# Patient Record
Sex: MALE | NOT HISPANIC OR LATINO
[De-identification: names, ages, dates, MRNs, and addresses within clinical notes are randomized per-mention and may not be internally consistent; named-entity substitution may affect disease eponyms.]

---

## 2022-08-18 PROBLEM — Z00.00 ENCOUNTER FOR PREVENTIVE HEALTH EXAMINATION: Status: ACTIVE | Noted: 2022-08-18

## 2022-08-19 ENCOUNTER — NON-APPOINTMENT (OUTPATIENT)
Age: 42
End: 2022-08-19

## 2022-08-22 ENCOUNTER — APPOINTMENT (OUTPATIENT)
Dept: CARDIOLOGY | Facility: CLINIC | Age: 42
End: 2022-08-22

## 2022-08-22 ENCOUNTER — NON-APPOINTMENT (OUTPATIENT)
Age: 42
End: 2022-08-22

## 2022-08-22 ENCOUNTER — RESULT REVIEW (OUTPATIENT)
Age: 42
End: 2022-08-22

## 2022-08-22 VITALS
DIASTOLIC BLOOD PRESSURE: 80 MMHG | SYSTOLIC BLOOD PRESSURE: 137 MMHG | BODY MASS INDEX: 26.48 KG/M2 | OXYGEN SATURATION: 98 % | HEIGHT: 70 IN | HEART RATE: 54 BPM | WEIGHT: 185 LBS

## 2022-08-22 DIAGNOSIS — R07.9 CHEST PAIN, UNSPECIFIED: ICD-10-CM

## 2022-08-22 DIAGNOSIS — Z82.49 FAMILY HISTORY OF ISCHEMIC HEART DISEASE AND OTHER DISEASES OF THE CIRCULATORY SYSTEM: ICD-10-CM

## 2022-08-22 DIAGNOSIS — Z86.79 PERSONAL HISTORY OF OTHER DISEASES OF THE CIRCULATORY SYSTEM: ICD-10-CM

## 2022-08-22 DIAGNOSIS — Z78.9 OTHER SPECIFIED HEALTH STATUS: ICD-10-CM

## 2022-08-22 DIAGNOSIS — R01.1 CARDIAC MURMUR, UNSPECIFIED: ICD-10-CM

## 2022-08-22 PROCEDURE — 93000 ELECTROCARDIOGRAM COMPLETE: CPT

## 2022-08-22 PROCEDURE — 99204 OFFICE O/P NEW MOD 45 MIN: CPT | Mod: 25

## 2022-08-22 NOTE — PHYSICAL EXAM
[Normal S1, S2] : normal S1, S2 [No Rub] : no rub [No Gallop] : no gallop [Soft] : abdomen soft [Non Tender] : non-tender [Normal Radial B/L] : normal radial B/L [Normal] : alert and oriented, normal memory [de-identified] : DM 2/6

## 2022-08-22 NOTE — REVIEW OF SYSTEMS
[Feeling Fatigued] : feeling fatigued [Snoring] : snoring [Negative] : Heme/Lymph [Fever] : no fever [Headache] : no headache [Chills] : no chills [Cough] : no cough [Wheezing] : no wheezing [Coughing Up Blood] : no hemoptysis [Abdominal Pain] : no abdominal pain [Nausea] : no nausea [Vomiting] : no vomiting [Diarrhea] : diarrhea [Blood in stool] : no blood in stoo [Urinary Frequency] : no change in urinary frequency [Hematuria] : no hematuria [Pain During Urination] : no dysuria

## 2022-08-22 NOTE — HISTORY OF PRESENT ILLNESS
[FreeTextEntry1] : 42 yo AA with history of elevated BP not officially diagnosed with HTN, who presents for cardiac evaluation after ED visit.  EKG, CXR and blood work were done and pt was sent home.  Pt developed R sided chest discomfort while working.  Went to the pharmacy and BP was checked there 160/80 per pt.  Pt then told his supervisor about it and was sent to the ED at [facility].  He has been measuring at home 130-150s systolics.  The pain is still there intermittent.  Pain mostly at rest, non radiating, mild to moderate in intensity, nagging sharp pain.  Denies WALLS, SOB, palpations, COVID x 2, last covid illness last summer.  \par \par Soc Hx: non smoker, + ETOH use 2-3 times per week abut two drinks, no illicit drug use, work as  \par Fam Hx: grandmother  AAA 70s; aunt  AAA 60s, cousin diagnosed with AAA s/p surgery

## 2022-08-22 NOTE — DISCUSSION/SUMMARY
[FreeTextEntry1] : 40 yo AA with history of elevated BP not officially diagnosed with HTN, who presents for cardiac evaluation after ED visit.  Given abn EKG, elevated BP and murmur will obtain TTE to rule out structural heart disease.  Given chest pain which is mostly atypical will obtain exercise stress testing.  Elevated BP without official diagnosis of HTN which he likely has.  He wishes to refrain from medication use at this time and attempt in lifestyle modification and ambulatory monitoring for now.  Long discussion about risks of uncontrolled HTN.  Educated on lifestyle modification including but not limited to wt loss, regular exercise per AHA guidelines, low salt intake, low fat/low cholesterol diet.  He may benefit from sleep study as well, which we will discuss at the next visit.  In the interim he will keep BP log.  Will obtain renal doppler to rule out HENRRY, labs to rule out secondary causes of HTN ordered.  \par \par Follow up in 6 week.   [EKG obtained to assist in diagnosis and management of assessed problem(s)] : EKG obtained to assist in diagnosis and management of assessed problem(s)

## 2022-08-30 LAB
ALBUMIN SERPL ELPH-MCNC: 5 G/DL
ALDOSTERONE SERUM: 8.3 NG/DL
ALP BLD-CCNC: 81 U/L
ALT SERPL-CCNC: 64 U/L
ANION GAP SERPL CALC-SCNC: 14 MMOL/L
APPEARANCE: CLEAR
AST SERPL-CCNC: 37 U/L
BACTERIA: NEGATIVE
BILIRUB SERPL-MCNC: 0.2 MG/DL
BILIRUBIN URINE: NEGATIVE
BLOOD URINE: NEGATIVE
BUN SERPL-MCNC: 13 MG/DL
CALCIUM SERPL-MCNC: 9.6 MG/DL
CHLORIDE SERPL-SCNC: 105 MMOL/L
CHOLEST SERPL-MCNC: 252 MG/DL
CO2 SERPL-SCNC: 22 MMOL/L
COLOR: YELLOW
CREAT SERPL-MCNC: 1.04 MG/DL
EGFR: 93 ML/MIN/1.73M2
ESTIMATED AVERAGE GLUCOSE: 120 MG/DL
GLUCOSE QUALITATIVE U: NEGATIVE
GLUCOSE SERPL-MCNC: 97 MG/DL
HBA1C MFR BLD HPLC: 5.8 %
HDLC SERPL-MCNC: 70 MG/DL
HYALINE CASTS: 0 /LPF
KETONES URINE: NEGATIVE
LDLC SERPL CALC-MCNC: 160 MG/DL
LEUKOCYTE ESTERASE URINE: NEGATIVE
METANEPHRINE, PL: 26.2 PG/ML
MICROSCOPIC-UA: NORMAL
NITRITE URINE: NEGATIVE
NONHDLC SERPL-MCNC: 182 MG/DL
NORMETANEPHRINE, PL: 129.5 PG/ML
PH URINE: 6
POTASSIUM SERPL-SCNC: 4.5 MMOL/L
PROT SERPL-MCNC: 7.6 G/DL
PROTEIN URINE: NORMAL
RED BLOOD CELLS URINE: 1 /HPF
RENIN PLASMA: 14.1 PG/ML
SODIUM SERPL-SCNC: 141 MMOL/L
SPECIFIC GRAVITY URINE: 1.02
SQUAMOUS EPITHELIAL CELLS: 0 /HPF
TRIGL SERPL-MCNC: 107 MG/DL
TSH SERPL-ACNC: 1.06 UIU/ML
UROBILINOGEN URINE: NORMAL
WHITE BLOOD CELLS URINE: 1 /HPF

## 2022-09-19 ENCOUNTER — OUTPATIENT (OUTPATIENT)
Dept: OUTPATIENT SERVICES | Facility: HOSPITAL | Age: 42
LOS: 1 days | End: 2022-09-19
Payer: COMMERCIAL

## 2022-09-19 ENCOUNTER — APPOINTMENT (OUTPATIENT)
Dept: CARDIOLOGY | Facility: CLINIC | Age: 42
End: 2022-09-19

## 2022-09-19 ENCOUNTER — APPOINTMENT (OUTPATIENT)
Dept: ULTRASOUND IMAGING | Facility: CLINIC | Age: 42
End: 2022-09-19

## 2022-09-19 ENCOUNTER — NON-APPOINTMENT (OUTPATIENT)
Age: 42
End: 2022-09-19

## 2022-09-19 ENCOUNTER — APPOINTMENT (OUTPATIENT)
Dept: CV DIAGNOSTICS | Facility: HOSPITAL | Age: 42
End: 2022-09-19

## 2022-09-19 DIAGNOSIS — R07.9 CHEST PAIN, UNSPECIFIED: ICD-10-CM

## 2022-09-19 DIAGNOSIS — Z00.8 ENCOUNTER FOR OTHER GENERAL EXAMINATION: ICD-10-CM

## 2022-09-19 PROCEDURE — 93018 CV STRESS TEST I&R ONLY: CPT

## 2022-09-19 PROCEDURE — 93306 TTE W/DOPPLER COMPLETE: CPT

## 2022-09-19 PROCEDURE — 93016 CV STRESS TEST SUPVJ ONLY: CPT

## 2022-09-19 PROCEDURE — 93017 CV STRESS TEST TRACING ONLY: CPT

## 2022-09-19 PROCEDURE — 93975 VASCULAR STUDY: CPT | Mod: 26

## 2022-09-19 PROCEDURE — 93975 VASCULAR STUDY: CPT

## 2022-10-07 ENCOUNTER — APPOINTMENT (OUTPATIENT)
Dept: CARDIOLOGY | Facility: CLINIC | Age: 42
End: 2022-10-07

## 2022-10-07 VITALS
BODY MASS INDEX: 25.77 KG/M2 | HEART RATE: 49 BPM | DIASTOLIC BLOOD PRESSURE: 78 MMHG | SYSTOLIC BLOOD PRESSURE: 143 MMHG | WEIGHT: 180 LBS | HEIGHT: 70 IN

## 2022-10-07 DIAGNOSIS — R94.31 ABNORMAL ELECTROCARDIOGRAM [ECG] [EKG]: ICD-10-CM

## 2022-10-07 DIAGNOSIS — R03.0 ELEVATED BLOOD-PRESSURE READING, W/OUT DIAGNOSIS OF HYPERTENSION: ICD-10-CM

## 2022-10-07 PROCEDURE — 93000 ELECTROCARDIOGRAM COMPLETE: CPT

## 2022-10-07 PROCEDURE — 99213 OFFICE O/P EST LOW 20 MIN: CPT | Mod: 25

## 2022-10-31 ENCOUNTER — NON-APPOINTMENT (OUTPATIENT)
Age: 42
End: 2022-10-31

## 2022-10-31 PROBLEM — R03.0 ELEVATED BLOOD PRESSURE READING WITHOUT DIAGNOSIS OF HYPERTENSION: Status: ACTIVE | Noted: 2022-08-22

## 2022-10-31 PROBLEM — R94.31 ABNORMAL EKG: Status: ACTIVE | Noted: 2022-08-22

## 2022-10-31 NOTE — PHYSICAL EXAM
[Normal S1, S2] : normal S1, S2 [No Rub] : no rub [No Gallop] : no gallop [Soft] : abdomen soft [Non Tender] : non-tender [Normal Radial B/L] : normal radial B/L [Normal] : alert and oriented, normal memory [de-identified] : DM 2/6

## 2022-10-31 NOTE — REVIEW OF SYSTEMS
[Fever] : no fever [Headache] : no headache [Chills] : no chills [Feeling Fatigued] : feeling fatigued [Cough] : no cough [Wheezing] : no wheezing [Coughing Up Blood] : no hemoptysis [Snoring] : snoring [Abdominal Pain] : no abdominal pain [Nausea] : no nausea [Vomiting] : no vomiting [Diarrhea] : diarrhea [Blood in stool] : no blood in stoo [Urinary Frequency] : no change in urinary frequency [Hematuria] : no hematuria [Pain During Urination] : no dysuria [Negative] : Heme/Lymph

## 2022-10-31 NOTE — DISCUSSION/SUMMARY
[FreeTextEntry1] : 40 yo AA with history of elevated BP not officially diagnosed with HTN, who presents for a follow up.  TTE showed no significant structural heart disease.  Stress test showed no significant ischemic changes, Duke score 6 low risk. He still wishes to refrain from medication use at this time and attempt in lifestyle modification and ambulatory monitoring for now.  Long discussion about risks of uncontrolled HTN.  Educated on lifestyle modification including but not limited to wt loss, regular exercise per AHA guidelines, low salt intake, low fat/low cholesterol diet.  He may benefit from sleep study as well, which we will discuss at the next visit.  In the interim he will keep BP log.   Renal doppler ruled out HENRRY, secondary causes of HTN labs thus far no significant findings.  \par \par Follow up in 6 months.   [EKG obtained to assist in diagnosis and management of assessed problem(s)] : EKG obtained to assist in diagnosis and management of assessed problem(s)

## 2022-10-31 NOTE — HISTORY OF PRESENT ILLNESS
[FreeTextEntry1] : 40 yo AA with history of elevated BP not officially diagnosed with HTN, who presents for a follow up.  Since the last visit he underwent TTE, stress testing.  Also secondary work for HTN was also done.  Reports BPs at home 130/80s.  \par \par Soc Hx: non smoker, + ETOH use 2-3 times per week abut two drinks, no illicit drug use, work as  \par Fam Hx: grandmother  AAA 70s; aunt  AAA 60s, cousin diagnosed with AAA s/p surgery

## 2023-04-11 ENCOUNTER — APPOINTMENT (OUTPATIENT)
Dept: CARDIOLOGY | Facility: CLINIC | Age: 43
End: 2023-04-11